# Patient Record
Sex: FEMALE | Race: OTHER | HISPANIC OR LATINO | URBAN - METROPOLITAN AREA
[De-identification: names, ages, dates, MRNs, and addresses within clinical notes are randomized per-mention and may not be internally consistent; named-entity substitution may affect disease eponyms.]

---

## 2021-10-14 ENCOUNTER — EMERGENCY (EMERGENCY)
Facility: HOSPITAL | Age: 51
LOS: 1 days | Discharge: HOME | End: 2021-10-14
Attending: EMERGENCY MEDICINE | Admitting: EMERGENCY MEDICINE
Payer: MEDICARE

## 2021-10-14 VITALS
DIASTOLIC BLOOD PRESSURE: 97 MMHG | SYSTOLIC BLOOD PRESSURE: 133 MMHG | OXYGEN SATURATION: 100 % | RESPIRATION RATE: 17 BRPM | TEMPERATURE: 98 F | HEART RATE: 99 BPM

## 2021-10-14 DIAGNOSIS — M54.9 DORSALGIA, UNSPECIFIED: ICD-10-CM

## 2021-10-14 DIAGNOSIS — R51.9 HEADACHE, UNSPECIFIED: ICD-10-CM

## 2021-10-14 DIAGNOSIS — Z20.822 CONTACT WITH AND (SUSPECTED) EXPOSURE TO COVID-19: ICD-10-CM

## 2021-10-14 DIAGNOSIS — F17.200 NICOTINE DEPENDENCE, UNSPECIFIED, UNCOMPLICATED: ICD-10-CM

## 2021-10-14 DIAGNOSIS — M79.18 MYALGIA, OTHER SITE: ICD-10-CM

## 2021-10-14 PROCEDURE — 99284 EMERGENCY DEPT VISIT MOD MDM: CPT | Mod: 25

## 2021-10-14 PROCEDURE — 71046 X-RAY EXAM CHEST 2 VIEWS: CPT | Mod: 26

## 2021-10-14 PROCEDURE — 99053 MED SERV 10PM-8AM 24 HR FAC: CPT

## 2021-10-14 NOTE — ED ADULT TRIAGE NOTE - ARRIVAL INFO ADDITIONAL COMMENTS
Patient reports taking Pfizer vaccination two days prior. Denies shortness of breath and dyspnea. Patient in no distress in Patient reports taking Pfizer vaccination two days prior. Denies shortness of breath and dyspnea. Patient in no distress with triage contact.

## 2021-10-14 NOTE — ED ADULT NURSE NOTE - OBJECTIVE STATEMENT
Pt presented to the ED with complaints of mid/lower back pain and night sweats. As per pt, she had the second dose of the pfizer vaccine on Tuesday, since then she has not been feeling well. Pt is alert and oriented, ambulatory, denies chest pain, SOB, palpitation, fever, nausea, and vomiting.

## 2021-10-15 VITALS
RESPIRATION RATE: 16 BRPM | SYSTOLIC BLOOD PRESSURE: 136 MMHG | DIASTOLIC BLOOD PRESSURE: 97 MMHG | TEMPERATURE: 98 F | HEART RATE: 77 BPM | OXYGEN SATURATION: 100 %

## 2021-10-15 LAB — SARS-COV-2 RNA SPEC QL NAA+PROBE: SIGNIFICANT CHANGE UP

## 2021-10-15 PROCEDURE — U0005: CPT

## 2021-10-15 PROCEDURE — 71046 X-RAY EXAM CHEST 2 VIEWS: CPT | Mod: 26

## 2021-10-15 PROCEDURE — 71046 X-RAY EXAM CHEST 2 VIEWS: CPT

## 2021-10-15 PROCEDURE — 99283 EMERGENCY DEPT VISIT LOW MDM: CPT | Mod: 25

## 2021-10-15 PROCEDURE — U0003: CPT

## 2021-10-15 RX ORDER — ACETAMINOPHEN 500 MG
1000 TABLET ORAL ONCE
Refills: 0 | Status: COMPLETED | OUTPATIENT
Start: 2021-10-15 | End: 2021-10-15

## 2021-10-15 RX ADMIN — Medication 1000 MILLIGRAM(S): at 01:42

## 2021-10-15 RX ADMIN — Medication 1000 MILLIGRAM(S): at 02:50

## 2021-10-15 NOTE — ED PROVIDER NOTE - OBJECTIVE STATEMENT
51 f  hx graves, hernia repair 5 yrs ago  c/o 1 day malaise, fatigue; back pain to flanks bilaterally, mild SOB, mild headache, sweats.  the back pain occasionally is stabbing, not worse with movement. cough with clear sputum, no hemoptysis, no fever. no chest pain.  had covid vaccine 2 days ago, and she attributes the symptoms to her the vaccine.    no hx of dvt/pe, heart disease, dm, htn, hld  meds: synthroid  allergy: pcn (hives) 51 f  hx graves tx with SU 10 yrs ago, hernia repair 5 yrs ago  c/o 1 day malaise, fatigue, sweating; bilat back pain that began while walking to work, mild headache.  the back pain occasionally is stabbing, not worse with movement. mild cough with clear sputum, no hemoptysis, no fever. no chest pain or palpitations.  the symptoms all began after getting her 2nd covid vaccine shot 2 days ago.    no hx of dvt/pe, heart disease, dm, htn, hld    meds: synthroid, iron supplements, MVI  allergy: pcn (hives)

## 2021-10-15 NOTE — ED PROVIDER NOTE - PHYSICAL EXAMINATION
oral
CONSTITUTIONAL: well-appearing female; NAD   SKIN: Normal color and turgor.  No rash.  HEAD: NC/AT.  EYES: Conjunctiva clear. EOMI. PERRL.    ENT: Airway clear. Normal voice.   RESPIRATORY:  Normal work of breathing. Lungs CTAB.  CARDIOVASCULAR:  RRR, S1S2. No M/R/G.      GI:  Abdomen soft, nontender.    MSK: Neck supple.  Muscular tenderness to paraspinal muscles bilaterally in thoracolumbar region.  No lower extremity edema or calf tenderness.  No joint swelling or ROM limitation.  NEURO: Alert and oriented; CN II-XII grossly intact. Speech clear. 5/5 strength in all extremities.  Good balance. Steady gait.

## 2021-10-15 NOTE — ED PROVIDER NOTE - CLINICAL SUMMARY MEDICAL DECISION MAKING FREE TEXT BOX
Myalgias to back, sweats, fatigue after taking Covid vaccine. Suspect normal response to vaccine.  Will get CXR, though suspicion low for pulm pathology.

## 2021-10-15 NOTE — ED PROVIDER NOTE - NSFOLLOWUPINSTRUCTIONS_ED_ALL_ED_FT
Follow up with primary care physician/Occupational Health tomorrow.  Return to the Emergency Department if you have any new or worsening symptoms, or if you have any concerns.  ======================    Musculoskeletal Pain    WHAT YOU NEED TO KNOW:    Musculoskeletal pain can occur in muscles, bones, ligaments, tendons, or nerves. The pain can be dull, achy, or sharp. You may have pain and tenderness to the touch as well. The pain can occur anywhere in your body. Musculoskeletal pain can be from an injury, or a medical condition such as polymyositis.    DISCHARGE INSTRUCTIONS:    Return to the emergency department if:   •You have severe pain when you move the area.      •You lose feeling in the area.      •You have new or worse pain or swelling in the area. Your skin may feel tight.      Call your doctor or pain specialist if:   •You have a fever.      •You have pain that does not get better with treatment.      •You have trouble sleeping because of your pain.      •Your painful area becomes more tender, red, and warm to the touch.      •You have less movement of the painful area.      •You have questions or concerns about your condition or care.      Self-care:   •Rest as directed. Avoid activity that causes pain. You may be able to return to normal activity when you can move without pain. Follow directions for rest and activity. You are at risk for injury for 3 weeks after your symptoms go away.      •Ice the painful area to decrease pain and swelling. Use an ice pack, or put ice in a plastic bag and cover it with a towel. Always put a cloth between the ice and your skin. Apply the ice as often as directed for the first 24 to 48 hours.      •Apply compression to the area, if directed. Your healthcare provider may want you to use a splint, brace, or elastic bandage. Compression helps decrease pain and swelling in an arm or leg. A splint, brace, or bandage will also help protect the painful area when you move around.  How to Wrap an Elastic Bandage           •Elevate a painful arm or leg to reduce swelling and pain. Elevate your limb while you are sitting or lying. Prop a painful leg on pillows to keep it above the level of your heart.         Elevate Leg           Medicines: You may need any of the following:  •NSAIDs help decrease swelling and pain or fever. This medicine is available with or without a doctor's order. NSAIDs can cause stomach bleeding or kidney problems in certain people. If you take blood thinner medicine, always ask your healthcare provider if NSAIDs are safe for you. Always read the medicine label and follow directions.      •Acetaminophen decreases pain and fever. It is available without a doctor's order. Ask how much to take and how often to take it. Follow directions. Read the labels of all other medicines you are using to see if they also contain acetaminophen, or ask your doctor or pharmacist. Acetaminophen can cause liver damage if not taken correctly. Do not use more than 4 grams (4,000 milligrams) total of acetaminophen in one day.       •Muscle relaxers help relax your muscles to decrease pain and muscle spasms.      •Steroids may be given to decrease redness, pain, and swelling.      •Take your medicine as directed. Contact your healthcare provider if you think your medicine is not helping or if you have side effects. Tell him or her if you are allergic to any medicine. Keep a list of the medicines, vitamins, and herbs you take. Include the amounts, and when and why you take them. Bring the list or the pill bottles to follow-up visits. Carry your medicine list with you in case of an emergency.      Follow up with your doctor or pain specialist as directed: You may need more tests to help healthcare providers find the cause of your muscle pain. You may need physical therapy to learn muscle strengthening exercises. Write down your questions so you remember to ask them during your visits.

## 2021-10-15 NOTE — ED PROVIDER NOTE - PATIENT PORTAL LINK FT
You can access the FollowMyHealth Patient Portal offered by [facility] by registering at the following website: [email or web address]. By joining [employer]’s FollowMyHealth portal, you will also be able to view your health information using other applications (apps) compatible with our system.

## 2021-10-15 NOTE — ED PROVIDER NOTE - NS ED ROS FT
CONSTITUTIONAL: Sweats  NEURO: No dizziness, no syncope; No focal weakness/tingling/numbness  EYES: No visual changes  ENT: No rhinorrhea or sore throat  PULM: No cough or dyspnea  CV: No chest pain or palpitations  GI: No abdominal pain, vomiting, or diarrhea  : No dysuria, hematuria, frequency  MSK: No neck pain, no joint pain  SKIN: no rash or unusual bruising

## 2022-06-09 ENCOUNTER — NON-APPOINTMENT (OUTPATIENT)
Age: 52
End: 2022-06-09

## 2023-05-02 ENCOUNTER — NON-APPOINTMENT (OUTPATIENT)
Age: 53
End: 2023-05-02

## 2023-08-24 ENCOUNTER — EMERGENCY (EMERGENCY)
Facility: HOSPITAL | Age: 53
LOS: 1 days | Discharge: HOME | End: 2023-08-24
Admitting: EMERGENCY MEDICINE
Payer: COMMERCIAL

## 2023-08-24 VITALS
SYSTOLIC BLOOD PRESSURE: 148 MMHG | TEMPERATURE: 98 F | HEART RATE: 77 BPM | HEIGHT: 57 IN | RESPIRATION RATE: 18 BRPM | OXYGEN SATURATION: 96 % | WEIGHT: 145.06 LBS | DIASTOLIC BLOOD PRESSURE: 87 MMHG
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
## 2023-08-24 DIAGNOSIS — S63.602A UNSPECIFIED SPRAIN OF LEFT THUMB, INITIAL ENCOUNTER: ICD-10-CM
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
## 2023-08-24 DIAGNOSIS — Z88.0 ALLERGY STATUS TO PENICILLIN: ICD-10-CM

## 2023-08-24 PROCEDURE — 99053 MED SERV 10PM-8AM 24 HR FAC: CPT

## 2023-08-24 PROCEDURE — 99283 EMERGENCY DEPT VISIT LOW MDM: CPT | Mod: 25

## 2023-08-24 PROCEDURE — 99284 EMERGENCY DEPT VISIT MOD MDM: CPT

## 2023-08-24 PROCEDURE — 73140 X-RAY EXAM OF FINGER(S): CPT

## 2023-08-24 PROCEDURE — 73140 X-RAY EXAM OF FINGER(S): CPT | Mod: 26,LT

## 2023-08-24 RX ORDER — IBUPROFEN 200 MG
600 TABLET ORAL ONCE
Refills: 0 | Status: COMPLETED | OUTPATIENT
Start: 2023-08-24 | End: 2023-08-24

## 2023-08-24 RX ORDER — LEVOTHYROXINE SODIUM 125 MCG
1 TABLET ORAL
Qty: 0 | Refills: 0 | DISCHARGE

## 2023-08-24 RX ADMIN — Medication 600 MILLIGRAM(S): at 03:02

## 2023-08-24 NOTE — ED PROVIDER NOTE - MUSCULOSKELETAL, MLM
all extremities grossly appears normal, good distal pulse sand normal sensations illicit b/l, symmetrical, left thumb tenderness to distal phalanx, NROM( flexion and extension)

## 2023-08-24 NOTE — ED ADULT TRIAGE NOTE - CHIEF COMPLAINT QUOTE
Pt presents to the ED with complaints of left thumb pain. Pt states "I was cleaning the alaris pumps and I injured my left thumb".

## 2023-08-24 NOTE — ED PROVIDER NOTE - NSFOLLOWUPINSTRUCTIONS_ED_ALL_ED_FT
Thumb Sprain  A hand showing a thumb ligament and a sprain of the thumb.  A thumb sprain is an injury to one of the bands of tissue that connect bones to each other (a ligament) in your thumb. The ligament may be stretched too much, or it may be torn. A tear can be either partial or complete. How bad, or severe, the sprain is depends on how much of the ligament was damaged or torn.    What are the causes?  A thumb sprain is often caused by a fall or an accident, such as when you hold your hands out to catch something or to protect yourself.    What increases the risk?  This injury is more likely to occur in people who play sports that involve:  A risk of falling, such as skiing.  Catching an object, such as basketball.  What are the signs or symptoms?  Symptoms of this condition include:  Not being able to move the thumb normally.  Swelling.  Tenderness.  Bruising.  How is this diagnosed?  This condition may be diagnosed based on:  Your symptoms and medical history. Your health care provider may ask about any recent injuries to your thumb.  A physical exam.  Imaging studies, such as X-rays, ultrasound, or MRI.  How is this treated?  Treatment for this condition depends on how severe your sprain is.  If your ligament is overstretched or partially torn, treatment usually involves keeping your thumb in a fixed position (immobilization) for at least 4 to 6 weeks. Your health care provider will apply a bandage (dressing), splint, brace, or cast to keep your thumb from moving until it heals.  If your ligament is fully torn, you may need surgery to reconnect the ligament to the bone. After surgery, you will need to wear a cast or splint on your thumb.  Your health care provider may also recommend physical therapy to strengthen your thumb.    Follow these instructions at home:  If you have a removable splint, bandage, or brace:    Wear the splint, bandage, or brace as told by your health care provider. Remove it only as told by your health care provider.  Check the skin around the splint, bandage, or brace every day. Tell your health care provider about any concerns.  Loosen the splint, bandage, or brace if your thumb or fingers tingle, become numb, or turn cold and blue.  Keep it clean and dry.  If you have a nonremovable cast:    Do not put pressure on any part of the cast until it is fully hardened. This may take several hours.  Do not stick anything inside the cast to scratch your skin. Doing that increases your risk of infection.  Check the skin around the cast every day. Tell your health care provider about any concerns.  You may put lotion on dry skin around the edges of the cast. Do not put lotion on the skin underneath the cast.  Keep it clean and dry.  Bathing    Do not take baths, swim, or use a hot tub until your health care provider approves. Ask your health care provider if you may take showers. You may only be allowed to take sponge baths.  If your splint, bandage, brace, or cast is not waterproof:  Do not let it get wet.  Cover it with a watertight covering when you take a bath or shower.  Managing pain, stiffness, and swelling    Bag of ice on a towel on the skin.  If directed, put ice on your thumb. To do this:  If you have a removable splint, bandage, or brace, remove it as told by your health care provider.  Put ice in a plastic bag.  Place a towel between your skin and the bag, or between your cast and the bag.  Leave the ice on for 20 minutes, 2–3 times a day.  Remove the ice if your skin turns bright red. This is very important. If you cannot feel pain, heat, or cold, you have a greater risk of damage to the area.  Move your fingers often to reduce stiffness and swelling.  Raise (elevate) the injured area above the level of your heart while you are sitting or lying down.  Activity    Return to your normal activities as told by your health care provider. Ask your health care provider what activities are safe for you.  Do physical therapy exercises as directed. After your splint, bandage, brace, or cast is removed, your health care provider may recommend that you:  Move your thumb in circles.  Touch your thumb to your pinky finger.  Do these exercises several times a day.  Ask your health care provider if you may use a hand exerciser to strengthen your muscles.  If your thumb feels stiff while you are exercising it, try doing the exercises while soaking your hand in warm water.  Driving    Ask your health care provider when it is safe to drive if you have a splint, bandage, brace, or cast on your hand or thumb.  Ask your health care provider if the medicine prescribed to you requires you to avoid driving or using machinery.  General instructions    Take over-the-counter and prescription medicines only as told by your health care provider.  Do not use any products that contain nicotine or tobacco. These products include cigarettes, chewing tobacco, and vaping devices, such as e-cigarettes. These can delay bone healing. If you need help quitting, ask your health care provider.  Do not wear rings on your injured thumb.  Keep all follow-up visits. This is important.  Contact a health care provider if:  You have pain that gets worse or does not get better with medicine.  You have bruising or swelling that gets worse.  Your cast, brace, or splint is damaged.  Get help right away if:  Your thumb feels numb, tingles, turns cold, or turns blue, even after loosening your splint, bandage, or brace.  Summary  A thumb sprain is an injury to one of the bands of tissue that connect bones to each other (a ligament) in your thumb.  Thumb sprains are more likely to occur in people who play sports that involve a risk of falling or having to catch an object.  Treatment will depend on how severe the sprain is, but it will require keeping the thumb in a fixed position (immobilization). It might require surgery.  Make sure you understand and follow all of your health care provider's instructions for home care.  This information is not intended to replace advice given to you by your health care provider. Make sure you discuss any questions you have with your health care provider.

## 2023-08-24 NOTE — ED ADULT NURSE NOTE - OBJECTIVE STATEMENT
Received ambulatory with steady gait with chief complaints of L thumb pain/injury. Pt states "I was cleaning the alaris pump and I injured my L thumb. I applied ice pack on it and it feels better. +movement and sensation to affected area. Unable to assess cap refil, pt with acrylic nails.     Patient AOX4, speaking full sentences.  Patient denies chest pain, shortness of breath, difficulty breathing and any form of distress not noted. Resps even and nonlabored. Moves all extremities. No obvious trauma/injury/deformity noted. Patient oriented to ED area. All needs attended. POC reviewed. Purposeful proactive hourly rounding in progress.

## 2023-08-24 NOTE — ED ADULT NURSE NOTE - NSFALLUNIVINTERV_ED_ALL_ED
Bed/Stretcher in lowest position, wheels locked, appropriate side rails in place/Call bell, personal items and telephone in reach/Instruct patient to call for assistance before getting out of bed/chair/stretcher/Non-slip footwear applied when patient is off stretcher/[place] to call system/Physically safe environment - no spills, clutter or unnecessary equipment/Purposeful proactive rounding/Room/bathroom lighting operational, light cord in reach

## 2023-08-24 NOTE — ED PROVIDER NOTE - OBJECTIVE STATEMENT
52 yo female, [employer] employee accidentally hurt her left thumb tonight at work. pt reports her thumb was suddenly hyperextended and now is very painful. Pt able flex and extend he injured digit but with pain. No discolorations, no deformity , no fingernail injury noted.

## 2023-08-24 NOTE — ED PROVIDER NOTE - PATIENT PORTAL LINK FT
You can access the FollowMyHealth Patient Portal offered by [facility] by registering at the following website: [email or web address]. By joining [employer]’s FollowMyHealth portal, you will also be able to view your health information using other applications (apps) compatible with our system.

## 2023-08-24 NOTE — ED PROVIDER NOTE - CARE PROVIDER_API CALL
Sebastian Drake.  Orthopaedic Surgery  [address] [place] [address], [address]  [place] [address]  Phone: [phone]  Fax: [phone]  Follow Up Time:

## 2023-08-24 NOTE — ED PROVIDER NOTE - CLINICAL SUMMARY MEDICAL DECISION MAKING FREE TEXT BOX
54 yo female, [employer] employee accidentally hurt her left thumb tonight at work. pt reports her thumb was suddenly hyperextended and now is very painful. Pt able flex and extend he injured digit but with pain. No discolorations, no deformity , no fingernail injury noted.  will check xray to r/o possible fx, most likely sprain/ligamentous injury. anticipate thumb spica splint, NSAIds for pain and out pt ortho hand f/u

## 2023-08-26 PROBLEM — Z00.00 ENCOUNTER FOR PREVENTIVE HEALTH EXAMINATION: Status: ACTIVE | Noted: 2023-08-26

## 2023-08-28 ENCOUNTER — APPOINTMENT (OUTPATIENT)
Dept: ORTHOPEDIC SURGERY | Facility: CLINIC | Age: 53
End: 2023-08-28
Payer: COMMERCIAL

## 2023-08-28 VITALS — WEIGHT: 145 LBS | HEIGHT: 57 IN | BODY MASS INDEX: 31.28 KG/M2 | RESPIRATION RATE: 16 BRPM

## 2023-08-28 DIAGNOSIS — S63.602A UNSPECIFIED SPRAIN OF LEFT THUMB, INITIAL ENCOUNTER: ICD-10-CM

## 2023-08-28 DIAGNOSIS — Z78.9 OTHER SPECIFIED HEALTH STATUS: ICD-10-CM

## 2023-08-28 PROCEDURE — 73140 X-RAY EXAM OF FINGER(S): CPT | Mod: FA

## 2023-08-28 PROCEDURE — 99203 OFFICE O/P NEW LOW 30 MIN: CPT

## 2023-08-28 NOTE — PHYSICAL EXAM
[de-identified] : Physical exam shows the patient to be alert and oriented x3, capable of ambulation. The patient is well-developed and well-nourished in no apparent respiratory distress. Majority of the skin is intact bilaterally in the upper extremities without lymphadenopathy at the elbows.  The wrists have a symmetric range of motion bilaterally. There is no tenderness over the scaphoid, scapholunate or lunotriquetral ligaments bilaterally. There is a negative Adams test bilaterally. There is negative tenderness over the radial ulnar joint or TFCC and no evidence of instability bilaterally. No tenderness over the pisotriquetral or hamate hook or CMC joint bilaterally. There is 5 over 5 strength of the wrists bilaterally. No tenderness over the MP joint sesamoids or A1 pulley. There is swelling and tenderness localized over the volar plate of the left thumb IP joint but no evidence of instability as compared to the opposite side.  No tenderness of the collateral ligaments with the flexor and extensor intact.  Range of motion of the MP joint 0/60 symmetric bilaterally. Range of motion of the IP joint 0/65 on the right and 0/25 on the left with active equaling passive range of motion  There is good capillary refill of the digits bilaterally.There is no masses or sensitivity over the median and ulnar nerves at the level of the wrist. There is a negative Tinel's and negative Phalen's sign bilaterally. The sensation is grossly intact bilaterally. [de-identified] : PA of both thumbs shows joint spaces symmetric bilaterally without evidence of soft tissue calcifications.  There are bilateral sesamoids at the IP joint which are symmetric and without evidence of fracture.  Lateral and oblique of the left thumb shows no evidence of fractures or dislocations with joint spaces well-preserved

## 2023-08-28 NOTE — HISTORY OF PRESENT ILLNESS
[Left] : left hand dominant [FreeTextEntry1] : DOI-8/24/23 Patient presents with 4 days status post left thumb injury sustained while pushing a button on the IV machine at work.  Patient reports feeling extension of the thumb and base of thumb pain.  She denies any numbness or tingling of the thumb.  She was treated at urgent care where x-rays were done showing a sprain of the thumb and no evidence of a fracture, Patient was placed in a removable splint which was uncomfortable and so she  switched to Coban wrap.

## 2023-08-28 NOTE — ASSESSMENT
[FreeTextEntry1] : Grade 1 left thumb IP volar plate tear without evidence of instability but residual stiffness.  A home program was outlined as well as referral to outside therapy.  She is cleared to return to work full duty starting on September 5, 2023.  Return to the office in 2 weeks earlier if there are issues or concerns discussed.

## 2024-03-28 ENCOUNTER — NON-APPOINTMENT (OUTPATIENT)
Age: 54
End: 2024-03-28

## 2024-04-02 ENCOUNTER — NON-APPOINTMENT (OUTPATIENT)
Age: 54
End: 2024-04-02

## 2024-04-05 ENCOUNTER — APPOINTMENT (OUTPATIENT)
Dept: DERMATOLOGY | Facility: CLINIC | Age: 54
End: 2024-04-05
Payer: COMMERCIAL

## 2024-04-05 DIAGNOSIS — L40.9 PSORIASIS, UNSPECIFIED: ICD-10-CM

## 2024-04-05 PROCEDURE — 99204 OFFICE O/P NEW MOD 45 MIN: CPT

## 2024-04-05 RX ORDER — CLOBETASOL PROPIONATE 0.5 MG/G
0.05 OINTMENT TOPICAL
Qty: 1 | Refills: 1 | Status: ACTIVE | COMMUNITY
Start: 2024-04-05 | End: 1900-01-01

## 2024-04-05 NOTE — PHYSICAL EXAM
[Face] : Face [R Arm] : R Arm [L Arm] : L Arm [FreeTextEntry3] : R radial palm: well defined scaling plaque with small studded pustules/vesicles   KOH scraping was negative for fungus

## 2024-04-05 NOTE — ASSESSMENT
[FreeTextEntry1] : #chronic psoriasiform plaque - hand ddx: palmar plantar pustulosis vs. dyshidrotic eczema vs. ACD.  -start clobetasol 0.05% ointment BID for 2 wks on 1 wk off as needed -moisturizer daily with thick emollient like aquaphor -rtc 6-8 weeks

## 2024-04-05 NOTE — HISTORY OF PRESENT ILLNESS
[FreeTextEntry1] : NPV- hand rash  [de-identified] : DENNIE CEDENO 52yo F presents for hand rash x 4 years.   Plaque on R palm.  On and off, Sometimes itches. Responds to [facility].  Works in hospital, uses hand  frequently.   PMH -no history of skin cancer  FHx  -No family history of melanoma

## 2024-05-24 ENCOUNTER — APPOINTMENT (OUTPATIENT)
Dept: DERMATOLOGY | Facility: CLINIC | Age: 54
End: 2024-05-24
Payer: COMMERCIAL

## 2024-05-24 DIAGNOSIS — L20.9 ATOPIC DERMATITIS, UNSPECIFIED: ICD-10-CM

## 2024-05-24 PROCEDURE — 99214 OFFICE O/P EST MOD 30 MIN: CPT

## 2024-05-24 RX ORDER — LEVOTHYROXINE SODIUM 175 UG/1
175 TABLET ORAL
Refills: 0 | Status: ACTIVE | COMMUNITY

## 2024-05-24 RX ORDER — RUXOLITINIB 15 MG/G
1.5 CREAM TOPICAL
Qty: 1 | Refills: 2 | Status: ACTIVE | COMMUNITY
Start: 2024-05-24

## 2024-05-24 NOTE — HISTORY OF PRESENT ILLNESS
[FreeTextEntry1] : RPA - chronic psoriasiform plaque - hand [de-identified] : Dennie Cedeno is a 55 y/o F presenting to the clinic for a follow up.   Clobetasol helping but rash comes and goes still. better.   ___ INITIAL HPI 4/5/24 NPV- hand rash DENNIE CEDENO 52yo F presents for hand rash x 4 years.  Plaque on R palm. On and off, Sometimes itches. Responds to [facility]. Works in hospital, uses hand  frequently.  PMH -no history of skin cancer  FHx -No family history of melanoma

## 2024-05-24 NOTE — ASSESSMENT
[FreeTextEntry1] : # chronic psoriasiform plaque - hand Still active  ddx: palmar plantar pustulosis vs. dyshidrotic eczema vs. ACD.  -START opzelura cream BID -Continue clobetasol 0.05% oint on Saturday and Sunday BID as needed for flares -moisturizer daily with thick emollient like aquaphor. samples of aquaphor and vanicream given -consider patch testing, info for Elen Blochin MD, advanced derm, derm specs given. She can call to see if her insurance is accepted, -rtc 8-12 weeks

## 2024-05-24 NOTE — PHYSICAL EXAM
[Face] : Face [R Arm] : R Arm [L Arm] : L Arm [FreeTextEntry3] : R radial palm: improvement of well defined scaling plaque

## 2024-08-14 ENCOUNTER — NON-APPOINTMENT (OUTPATIENT)
Age: 54
End: 2024-08-14

## 2024-08-16 ENCOUNTER — APPOINTMENT (OUTPATIENT)
Dept: DERMATOLOGY | Facility: CLINIC | Age: 54
End: 2024-08-16